# Patient Record
Sex: MALE | Race: WHITE | Employment: STUDENT | URBAN - METROPOLITAN AREA
[De-identification: names, ages, dates, MRNs, and addresses within clinical notes are randomized per-mention and may not be internally consistent; named-entity substitution may affect disease eponyms.]

---
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
## 2024-05-29 ENCOUNTER — OFFICE VISIT (OUTPATIENT)
Dept: URGENT CARE | Facility: CLINIC | Age: 24
End: 2024-05-29

## 2024-05-29 ENCOUNTER — HOSPITAL ENCOUNTER (OUTPATIENT)
Facility: MEDICAL CENTER | Age: 24
End: 2024-05-29
Attending: NURSE PRACTITIONER
Payer: COMMERCIAL

## 2024-05-29 VITALS
SYSTOLIC BLOOD PRESSURE: 121 MMHG | HEART RATE: 64 BPM | HEIGHT: 72 IN | DIASTOLIC BLOOD PRESSURE: 78 MMHG | WEIGHT: 161.8 LBS | RESPIRATION RATE: 17 BRPM | TEMPERATURE: 98.8 F | BODY MASS INDEX: 21.91 KG/M2 | OXYGEN SATURATION: 98 %

## 2024-05-29 DIAGNOSIS — B35.4 TINEA CORPORIS: ICD-10-CM

## 2024-05-29 DIAGNOSIS — J02.9 SORE THROAT: ICD-10-CM

## 2024-05-29 LAB
HETEROPH AB SER QL LA: NEGATIVE
POCT INT CON NEG: NEGATIVE
POCT INT CON POS: POSITIVE
S PYO DNA SPEC NAA+PROBE: NOT DETECTED

## 2024-05-30 NOTE — PROGRESS NOTES
Giovany Peres is a 23 y.o. male who presents for Pharyngitis (7 days )      HPI  This is a new problem. Giovany Peres is a 23 y.o. patient who presents to urgent care with c/o: sore throat for 7 days. Concerned about throat infection ( strep and/ or std). Has been drinking fluids and doing gargles without relief. Denies fever, bodyaches, chills.   Hx of fungal skin infection around his neck. Was treated before for it with triamcinolone cream. It never fully went away but faded a little. Would like to have medication to treat. No known exposures. No other aggravating or alleviating factors.       ROS See HPI    Allergies:     No Known Allergies    PMSFS Hx:  History reviewed. No pertinent past medical history.  History reviewed. No pertinent surgical history.  History reviewed. No pertinent family history.  Social History     Tobacco Use    Smoking status: Never    Smokeless tobacco: Never   Substance Use Topics    Alcohol use: No       Problems:   There is no problem list on file for this patient.      Medications:   No current outpatient medications on file prior to visit.     No current facility-administered medications on file prior to visit.        Objective:     /78   Pulse 64   Temp 37.1 °C (98.8 °F) (Temporal)   Resp 17   Ht 1.829 m (6')   Wt 73.4 kg (161 lb 12.8 oz)   SpO2 98%   BMI 21.94 kg/m²     Physical Exam  Vitals and nursing note reviewed.   Constitutional:       Appearance: Normal appearance. He is normal weight.   HENT:      Mouth/Throat:      Pharynx: Posterior oropharyngeal erythema present.   Cardiovascular:      Rate and Rhythm: Normal rate.      Pulses: Normal pulses.   Pulmonary:      Effort: Pulmonary effort is normal.   Lymphadenopathy:      Head:      Right side of head: No tonsillar adenopathy.      Left side of head: No tonsillar adenopathy.      Cervical: No cervical adenopathy.   Skin:     General: Skin is warm.      Capillary Refill: Capillary refill takes less  than 2 seconds.      Findings: Rash present.             Comments: Annular rash in two areas of neck. + Central clearing. No significant scaling.    Neurological:      Mental Status: He is alert and oriented to person, place, and time.   Psychiatric:         Mood and Affect: Mood normal.         Behavior: Behavior normal.         Thought Content: Thought content normal.       Results for orders placed or performed in visit on 05/29/24   POCT CEPHEID GROUP A STREP - PCR   Result Value Ref Range    POC Group A Strep, PCR Not Detected Not Detected, Invalid   POCT Mononucleosis (mono)   Result Value Ref Range    Heterophile Screen Negative Negative, Invalid    Internal Control Positive Positive     Internal Control Negative Negative          Assessment /Associated Orders:      1. Sore throat  [place] & N. Gonorrhea    POCT CEPHEID GROUP A STREP - PCR    POCT Mononucleosis (mono)      2. Tinea corporis  terbinafine (LAMISIL) 1 % cream          Medical Decision Making:    Giovany is a very pleasant 23 y.o. male who is clinically stable at today's acute urgent care visit.  No acute distress noted.  VSS. Appropriate for outpatient care at this time.   Acute problem today with uncertain prognosis.   Educated in proper administration of  prescription medication(s) ordered today including safety, possible SE, risks, benefits, rationale and alternatives to therapy.     Throat GC / CT pending   Discussed Dx, management options (risks,benefits, and alternatives to planned treatment), natural progression and supportive care.  Expressed understanding and the treatment plan was agreed upon.   Questions were encouraged and answered   Return to urgent care prn if new or worsening sx or if there is no improvement in condition prn.    Educated in Red flags and indications to immediately call 911 or present to the Emergency Department.             Please note that this dictation was created using voice recognition software. I have worked  with consultants from the vendor as well as technical experts from [facility] to optimize the interface. I have made every reasonable attempt to correct obvious errors, but I expect that there are errors of grammar and possibly content that I did not discover before finalizing the note.  This note was electronically signed by provider

## 2024-05-31 LAB
SPEC CONTAINER SPEC: NORMAL
SPECIMEN SOURCE: NORMAL

## 2024-06-02 LAB
C TRACH DNA SPEC QL NAA+PROBE: NEGATIVE
N GONORRHOEA DNA SPEC QL NAA+PROBE: NEGATIVE
SPEC CONTAINER SPEC: NORMAL
SPECIMEN SOURCE: NORMAL

## 2024-06-25 ENCOUNTER — OFFICE VISIT (OUTPATIENT)
Dept: URGENT CARE | Facility: PHYSICIAN GROUP | Age: 24
End: 2024-06-25
Payer: COMMERCIAL

## 2024-06-25 VITALS
BODY MASS INDEX: 22.75 KG/M2 | DIASTOLIC BLOOD PRESSURE: 64 MMHG | TEMPERATURE: 98.8 F | WEIGHT: 168 LBS | RESPIRATION RATE: 14 BRPM | SYSTOLIC BLOOD PRESSURE: 110 MMHG | HEIGHT: 72 IN | OXYGEN SATURATION: 96 % | HEART RATE: 108 BPM

## 2024-06-25 DIAGNOSIS — U07.1 COVID-19: Primary | ICD-10-CM

## 2024-06-25 DIAGNOSIS — Z20.822 CLOSE EXPOSURE TO COVID-19 VIRUS: ICD-10-CM

## 2024-06-25 LAB
FLUAV RNA SPEC QL NAA+PROBE: NEGATIVE
FLUBV RNA SPEC QL NAA+PROBE: NEGATIVE
RSV RNA SPEC QL NAA+PROBE: NEGATIVE
SARS-COV-2 RNA RESP QL NAA+PROBE: POSITIVE

## 2024-06-25 PROCEDURE — 3078F DIAST BP <80 MM HG: CPT | Performed by: PHYSICIAN ASSISTANT

## 2024-06-25 PROCEDURE — 0241U POCT CEPHEID COV-2, FLU A/B, RSV - PCR: CPT | Performed by: PHYSICIAN ASSISTANT

## 2024-06-25 PROCEDURE — 3074F SYST BP LT 130 MM HG: CPT | Performed by: PHYSICIAN ASSISTANT

## 2024-06-25 PROCEDURE — 99213 OFFICE O/P EST LOW 20 MIN: CPT | Performed by: PHYSICIAN ASSISTANT

## 2024-06-25 RX ORDER — DEXTROMETHORPHAN HYDROBROMIDE AND PROMETHAZINE HYDROCHLORIDE 15; 6.25 MG/5ML; MG/5ML
5 SYRUP ORAL EVERY 6 HOURS PRN
Qty: 118 ML | Refills: 0 | Status: SHIPPED | OUTPATIENT
Start: 2024-06-25 | End: 2024-07-02

## 2024-06-25 ASSESSMENT — ENCOUNTER SYMPTOMS
CHILLS: 1
DIARRHEA: 0
MYALGIAS: 1
EYE PAIN: 0
SORE THROAT: 1
CONSTIPATION: 0
FEVER: 1
VOMITING: 0
SHORTNESS OF BREATH: 0
HEADACHES: 0
COUGH: 1
ABDOMINAL PAIN: 0
NAUSEA: 0

## 2024-06-25 NOTE — PROGRESS NOTES
Subjective:   Giovany Peres is a 24-year-old male notes who presents for Coronavirus Screening (Bodyaches chills, cogestion sorethroat, chills, hot and cold x2)      Otherwise healthy 24-year-old male notes 2-day history of sore throat, yesterday symptom [place] and more body aches, chills, subjective fevers congestion and coughing.  Family members tested positive for COVID last week that he has been exposed to.  He denies any history of asthma.  Currently without any difficulty breathing    Review of Systems   Constitutional:  Positive for chills, fever and malaise/fatigue.   HENT:  Positive for congestion and sore throat. Negative for ear pain.    Eyes:  Negative for pain.   Respiratory:  Positive for cough. Negative for shortness of breath.    Cardiovascular:  Negative for chest pain.   Gastrointestinal:  Negative for abdominal pain, constipation, diarrhea, nausea and vomiting.   Genitourinary:  Negative for dysuria.   Musculoskeletal:  Positive for myalgias.   Skin:  Negative for rash.   Neurological:  Negative for headaches.       Medications, Allergies, and current problem list reviewed today in Epic.     Objective:     /64 (BP Location: Right arm, Patient Position: Sitting, BP Cuff Size: Adult)   Pulse (!) 108   Temp 37.1 °C (98.8 °F) (Temporal)   Resp 14   Ht 1.829 m (6')   Wt 76.2 kg (168 lb)   SpO2 96%     Physical Exam  Vitals reviewed.   Constitutional:       Appearance: Normal appearance.   HENT:      Head: Normocephalic and atraumatic.      Right Ear: Tympanic membrane, ear canal and external ear normal.      Left Ear: Tympanic membrane, ear canal and external ear normal.      Nose: Rhinorrhea present.      Mouth/Throat:      Mouth: Mucous membranes are moist.      Pharynx: Oropharynx is clear.   Eyes:      Conjunctiva/sclera: Conjunctivae normal.      Pupils: Pupils are equal, round, and reactive to light.   Cardiovascular:      Rate and Rhythm: Normal rate and regular rhythm.    Pulmonary:      Effort: Pulmonary effort is normal.      Breath sounds: Normal breath sounds.   Musculoskeletal:      Cervical back: Normal range of motion.   Skin:     General: Skin is warm and dry.      Capillary Refill: Capillary refill takes less than 2 seconds.   Neurological:      Mental Status: He is alert and oriented to person, place, and time.         Assessment/Plan:     Diagnosis and associated orders:     1. COVID-19  promethazine-dextromethorphan (PROMETHAZINE-DM) 6.25-15 MG/5ML syrup    Nirmatrelvir&Ritonavir 300/100 20 x 150 MG & 10 x 100MG Tablet Therapy Pack      2. Close exposure to COVID-19 virus  POCT CEPHEID COV-2, FLU A/B, RSV - PCR         Comments/MDM:     Reviewed contraindications efficacy  Potential side effects low risk.  Patient would really like to try this medication and I discussed with him patient groups identified whether this is indicated, he suggested he is on R.I.C.E. patient.  Patient.  Cough medicine sent to the pharmacy as well, discussed sedation from this medication.  Reviewed CDC guidelines for isolation and return to work         Differential diagnosis, natural history, supportive care, and indications for immediate follow-up discussed.    Advised the patient to follow-up with the primary care physician for recheck, reevaluation, and consideration of further management.    Please note that this dictation was created using voice recognition software. I have made a reasonable attempt to correct obvious errors, but I expect that there are errors of grammar and possibly content that I did not discover before finalizing the note.    This note was electronically signed by Kingsley Santiago PA-C

## 2024-06-25 NOTE — LETTER
June 25, 2024    To Whom It May Concern:         This is confirmation that Giovany Peres attended his scheduled appointment with Kingsley Santiago P.A.-C. on 6/25/24.  Your employee was seen in our clinic today and had a POSITIVE Covid-19 test.    Since the results of testing are positive, your employee should follow the CDC guidelines.  These are isolation for a minimum of 5 days and at least 24 hours have passed since your last fever without the use of fever-reducing medications and all other symptoms have improved.  After completing the isolation the patient must continue to use a well fitting mask for an additional 5 days.  The health department may contact you and provide further directions regarding self-isolation and return to work.     This is the only note that will be provided from [facility] for this visit.  Your employee will require an appointment with a primary care provider if FMLA or disability forms are required.  If you have any questions please do not hesitate to call me at the phone number listed below.    Sincerely,    Kingsley Santiago P.A.-C.  [phone]

## 2025-05-12 ENCOUNTER — HOSPITAL ENCOUNTER (OUTPATIENT)
Facility: MEDICAL CENTER | Age: 25
End: 2025-05-12
Attending: NURSE PRACTITIONER
Payer: COMMERCIAL

## 2025-05-12 ENCOUNTER — OFFICE VISIT (OUTPATIENT)
Dept: URGENT CARE | Facility: CLINIC | Age: 25
End: 2025-05-12
Payer: COMMERCIAL

## 2025-05-12 VITALS
WEIGHT: 164 LBS | RESPIRATION RATE: 16 BRPM | HEIGHT: 72 IN | DIASTOLIC BLOOD PRESSURE: 70 MMHG | TEMPERATURE: 97.7 F | OXYGEN SATURATION: 94 % | SYSTOLIC BLOOD PRESSURE: 124 MMHG | BODY MASS INDEX: 22.21 KG/M2 | HEART RATE: 60 BPM

## 2025-05-12 DIAGNOSIS — R30.0 DYSURIA: ICD-10-CM

## 2025-05-12 DIAGNOSIS — R82.998 LEUKOCYTES IN URINE: ICD-10-CM

## 2025-05-12 DIAGNOSIS — N34.2 URETHRITIS: ICD-10-CM

## 2025-05-12 DIAGNOSIS — R36.9 PENILE DISCHARGE: ICD-10-CM

## 2025-05-12 LAB
APPEARANCE UR: CLEAR
BILIRUB UR STRIP-MCNC: NORMAL MG/DL
COLOR UR AUTO: YELLOW
GLUCOSE UR STRIP.AUTO-MCNC: NORMAL MG/DL
KETONES UR STRIP.AUTO-MCNC: NORMAL MG/DL
LEUKOCYTE ESTERASE UR QL STRIP.AUTO: NORMAL
NITRITE UR QL STRIP.AUTO: NORMAL
PH UR STRIP.AUTO: 7 [PH] (ref 5–8)
PROT UR QL STRIP: NORMAL MG/DL
RBC UR QL AUTO: NORMAL
SP GR UR STRIP.AUTO: 1.01
UROBILINOGEN UR STRIP-MCNC: 0.2 MG/DL

## 2025-05-12 PROCEDURE — 3074F SYST BP LT 130 MM HG: CPT | Performed by: NURSE PRACTITIONER

## 2025-05-12 PROCEDURE — 99214 OFFICE O/P EST MOD 30 MIN: CPT | Performed by: NURSE PRACTITIONER

## 2025-05-12 PROCEDURE — 81002 URINALYSIS NONAUTO W/O SCOPE: CPT | Performed by: NURSE PRACTITIONER

## 2025-05-12 PROCEDURE — 3078F DIAST BP <80 MM HG: CPT | Performed by: NURSE PRACTITIONER

## 2025-05-12 PROCEDURE — 87086 URINE CULTURE/COLONY COUNT: CPT

## 2025-05-12 PROCEDURE — 87591 N.GONORRHOEAE DNA AMP PROB: CPT

## 2025-05-12 PROCEDURE — 87491 CHLMYD TRACH DNA AMP PROBE: CPT

## 2025-05-12 RX ORDER — DOXYCYCLINE HYCLATE 100 MG
100 TABLET ORAL 2 TIMES DAILY
Qty: 14 TABLET | Refills: 0 | Status: SHIPPED | OUTPATIENT
Start: 2025-05-12 | End: 2025-05-19

## 2025-05-12 NOTE — PROGRESS NOTES
Giovany Peres is a 24 y.o. male who presents for UTI (Painful when urinating x 3-4 days patient explains, lower stomach pain )      HPI  This is a new problem. Giovany Peres is a 24 y.o. patient who presents to urgent care with c/o: burning with urination. Penile discharge and crusting on tip of penis.  Drainage in his undershorts. He has been with the same female partner for a long time. He does not think he has STD.  Occ mild intermittent right sided testicular discomfort that never lasts but a few minutes. Denies fever, abd pain, testicle swelling, dysparunia, nausea.   No other aggravating or alleviating factors.  Denies any other concerns at this time.         ROS See HPI    Allergies:     No Known Allergies    PMSFS Hx:  History reviewed. No pertinent past medical history.  History reviewed. No pertinent surgical history.  History reviewed. No pertinent family history.  Social History     Tobacco Use    Smoking status: Never    Smokeless tobacco: Never   Substance Use Topics    Alcohol use: No         Problems:   There is no problem list on file for this patient.      Medications:   Current Outpatient Medications on File Prior to Visit   Medication Sig Dispense Refill    Nirmatrelvir&Ritonavir 300/100 20 x 150 MG & 10 x 100MG Tablet Therapy Pack Take 300 mg nirmatrelvir (two 150 mg tablets) with 100 mg ritonavir (one 100 mg tablet) by mouth, with all three tablets taken together twice daily for 5 days. 30 Each 0    terbinafine (LAMISIL) 1 % cream Apply BID to rash for 14-21 days 42 g 0     No current facility-administered medications on file prior to visit.        Objective:     /70 (BP Location: Left arm, Patient Position: Sitting, BP Cuff Size: Large adult)   Pulse 60   Temp 36.5 °C (97.7 °F)   Resp 16   Ht 1.829 m (6')   Wt 74.4 kg (164 lb)   SpO2 94%   BMI 22.24 kg/m²     Physical Exam  Vitals and nursing note reviewed.   Constitutional:       Appearance: Normal appearance. He is  well-developed.   Cardiovascular:      Rate and Rhythm: Normal rate.      Pulses: Normal pulses.   Pulmonary:      Effort: Pulmonary effort is normal.   Abdominal:      Palpations: Abdomen is not rigid.      Tenderness: There is no abdominal tenderness.   Genitourinary:     Penis: Normal.       Testes: Normal. Cremasteric reflex is present.         Right: Mass, tenderness, swelling or testicular hydrocele not present.         Left: Mass, tenderness, swelling or testicular hydrocele not present.   Skin:     General: Skin is warm and dry.   Neurological:      Mental Status: He is alert and oriented to person, place, and time.             Assessment /Associated Orders:      1. Dysuria  POCT Urinalysis    doxycycline (VIBRAMYCIN) 100 MG Tab    URINE CULTURE(NEW)    Chlamydia/GC, PCR (Urine)      2. Penile discharge  POCT Urinalysis    doxycycline (VIBRAMYCIN) 100 MG Tab    URINE CULTURE(NEW)    Chlamydia/GC, PCR (Urine)      3. Urethritis  URINE CULTURE(NEW)    Chlamydia/GC, PCR (Urine)      4. Leukocytes in urine  URINE CULTURE(NEW)    Chlamydia/GC, PCR (Urine)            Medical Decision Making:    Giovany Peres is a very pleasant 24 y.o. male who is clinically stable at today's acute urgent care visit. Presents with acute problem/ concern today.    No acute distress is noted at the time of the visit.  VSS. Appropriate for outpatient care at this time.     Urine culture: pending   No testicular abnormality noted on exam. - low risk testicular torsion.   GC CT pending     Through shared decision making a discussion of the Dx and DDx, management options (risks,benefits, and alternatives to planned treatment), natural progression, supportive care and indications for immediate follow-up discussed. Expressed understanding and the treatment plan was agreed upon.    Questions were encouraged and answered     Follow Up:   Return to urgent care prn if new or worsening sx or if there is no improvement in condition prn.             Please note that this dictation was created using voice recognition software. I have worked with consultants from the vendor as well as technical experts from [facility] to optimize the interface. I have made every reasonable attempt to correct obvious errors, but I expect that there are errors of grammar and possibly content that I did not discover before finalizing the note.  This note was electronically signed by provider

## 2025-05-13 LAB
C TRACH DNA SPEC QL NAA+PROBE: NEGATIVE
N GONORRHOEA DNA SPEC QL NAA+PROBE: NEGATIVE
SPECIMEN SOURCE: NORMAL

## 2025-05-14 ENCOUNTER — RESULTS FOLLOW-UP (OUTPATIENT)
Dept: URGENT CARE | Facility: CLINIC | Age: 25
End: 2025-05-14

## 2025-05-15 LAB
BACTERIA UR CULT: NORMAL
SIGNIFICANT IND 70042: NORMAL
SITE SITE: NORMAL
SOURCE SOURCE: NORMAL